# Patient Record
Sex: FEMALE | Race: WHITE | Employment: OTHER | URBAN - METROPOLITAN AREA
[De-identification: names, ages, dates, MRNs, and addresses within clinical notes are randomized per-mention and may not be internally consistent; named-entity substitution may affect disease eponyms.]

---

## 2019-06-15 ENCOUNTER — TRANSFERRED RECORDS (OUTPATIENT)
Dept: HEALTH INFORMATION MANAGEMENT | Facility: CLINIC | Age: 39
End: 2019-06-15

## 2019-11-08 ENCOUNTER — TRANSFERRED RECORDS (OUTPATIENT)
Dept: HEALTH INFORMATION MANAGEMENT | Facility: CLINIC | Age: 39
End: 2019-11-08

## 2019-11-13 ENCOUNTER — TRANSFERRED RECORDS (OUTPATIENT)
Dept: HEALTH INFORMATION MANAGEMENT | Facility: CLINIC | Age: 39
End: 2019-11-13

## 2019-11-14 ENCOUNTER — TRANSFERRED RECORDS (OUTPATIENT)
Dept: HEALTH INFORMATION MANAGEMENT | Facility: CLINIC | Age: 39
End: 2019-11-14

## 2019-11-15 ENCOUNTER — TRANSFERRED RECORDS (OUTPATIENT)
Dept: HEALTH INFORMATION MANAGEMENT | Facility: CLINIC | Age: 39
End: 2019-11-15

## 2019-11-16 ENCOUNTER — TRANSFERRED RECORDS (OUTPATIENT)
Dept: HEALTH INFORMATION MANAGEMENT | Facility: CLINIC | Age: 39
End: 2019-11-16

## 2019-11-19 ENCOUNTER — TRANSFERRED RECORDS (OUTPATIENT)
Dept: HEALTH INFORMATION MANAGEMENT | Facility: CLINIC | Age: 39
End: 2019-11-19

## 2019-11-20 ENCOUNTER — TRANSFERRED RECORDS (OUTPATIENT)
Dept: HEALTH INFORMATION MANAGEMENT | Facility: CLINIC | Age: 39
End: 2019-11-20

## 2019-11-21 NOTE — TELEPHONE ENCOUNTER
MEDICAL RECORDS REQUEST   [facility]  Urology Clinic  [address] [place] [address]  PHONE: [phone]  Fax: [phone]        FUTURE VISIT INFORMATION                                                   RUSH Cesar: 1980 scheduled for future visit at [facility]    APPOINTMENT INFORMATION:    Date: 19 2PM    Provider:  Kerwin Salazar MD    Reason for Visit/Diagnosis: Leakage from urinary catheter     REFERRAL INFORMATION:    Referring provider:  Eduarda Bernstein     Specialty: MD     Referring providers clinic:  [facility]    Clinic contact number: [phone]      RECORDS REQUESTED FOR VISIT                                                     NOTES  STATUS/DETAILS   OFFICE NOTE from referring provider  RECEIVED    OFFICE NOTE from other specialist  RECEIVED   DISCHARGE SUMMARY from hospital  NA   DISCHARGE REPORT from the ER  NA   OPERATIVE REPORT  NA   MEDICATION LIST  RECEIVED     PRE-VISIT CHECKLIST      Record collection complete YES- [facility]  Images in  PACS   CT AND Pelvis 3/4/19   CT urogram 19    If no, please explain: Fax to [facility] to obtain recs -     Appointment appropriately scheduled           (right time/right provider) Yes   MyChart activation If no, please explain: In process   Questionnaire complete If no, please explain: In process      Action    Action Taken 2ND REQUEST SENT FOR IMG ([facility]) CDK 2019       Completed by: Jessica Gill

## 2019-11-27 ENCOUNTER — TRANSFERRED RECORDS (OUTPATIENT)
Dept: HEALTH INFORMATION MANAGEMENT | Facility: CLINIC | Age: 39
End: 2019-11-27

## 2019-12-02 ENCOUNTER — TRANSFERRED RECORDS (OUTPATIENT)
Dept: HEALTH INFORMATION MANAGEMENT | Facility: CLINIC | Age: 39
End: 2019-12-02

## 2019-12-10 ENCOUNTER — PRE VISIT (OUTPATIENT)
Dept: UROLOGY | Facility: CLINIC | Age: 39
End: 2019-12-10

## 2019-12-10 DIAGNOSIS — N31.9 NEUROGENIC BLADDER: Primary | ICD-10-CM

## 2019-12-10 NOTE — TELEPHONE ENCOUNTER
Reason for visit: Discuss options for neurogenic bladder and incontinence     Relevant information: pt has a urethral catheter, urethra is eroding per chart note, history of spinal cord injury    Records/imaging/labs/orders: labs available, orders for renal US placed    Pt called: message sent to scheduling team to set up renal US    At Rooming: regular

## 2019-12-16 ENCOUNTER — ANCILLARY PROCEDURE (OUTPATIENT)
Dept: ULTRASOUND IMAGING | Facility: CLINIC | Age: 39
End: 2019-12-16
Attending: UROLOGY
Payer: MEDICARE

## 2019-12-16 ENCOUNTER — OFFICE VISIT (OUTPATIENT)
Dept: UROLOGY | Facility: CLINIC | Age: 39
End: 2019-12-16
Payer: COMMERCIAL

## 2019-12-16 ENCOUNTER — PRE VISIT (OUTPATIENT)
Dept: UROLOGY | Facility: CLINIC | Age: 39
End: 2019-12-16

## 2019-12-16 DIAGNOSIS — N31.9 NEUROGENIC BLADDER: Primary | ICD-10-CM

## 2019-12-16 DIAGNOSIS — N31.9 NEUROGENIC BLADDER: ICD-10-CM

## 2019-12-16 PROBLEM — I26.99 PULMONARY EMBOLISM (H): Status: ACTIVE | Noted: 2018-02-01

## 2019-12-16 PROBLEM — A49.02 MRSA (METHICILLIN RESISTANT STAPHYLOCOCCUS AUREUS): Status: ACTIVE | Noted: 2018-09-10

## 2019-12-16 RX ORDER — INSULIN GLARGINE 100 [IU]/ML
25 INJECTION, SOLUTION SUBCUTANEOUS
COMMUNITY
Start: 2019-07-30

## 2019-12-16 RX ORDER — ARIPIPRAZOLE 2 MG/1
2 TABLET ORAL
COMMUNITY
Start: 2019-07-30

## 2019-12-16 RX ORDER — NICOTINE POLACRILEX 4 MG
15-30 LOZENGE BUCCAL
COMMUNITY

## 2019-12-16 RX ORDER — VANCOMYCIN 1.75 GRAM/500 ML IN 0.9 % SODIUM CHLORIDE INTRAVENOUS
COMMUNITY
Start: 2019-07-30

## 2019-12-16 RX ORDER — FERROUS SULFATE 325(65) MG
325 TABLET ORAL
COMMUNITY
Start: 2019-07-30

## 2019-12-16 RX ORDER — METRONIDAZOLE 500 MG/1
500 TABLET ORAL
COMMUNITY

## 2019-12-16 RX ORDER — DIPHENHYDRAMINE HCL 25 MG
25 TABLET ORAL
COMMUNITY
Start: 2019-07-30

## 2019-12-16 RX ORDER — AMPICILLIN TRIHYDRATE 250 MG
2 CAPSULE ORAL
COMMUNITY

## 2019-12-16 RX ORDER — ASCORBIC ACID 500 MG
500 TABLET ORAL
COMMUNITY

## 2019-12-16 RX ORDER — ONDANSETRON 4 MG/1
4 TABLET, ORALLY DISINTEGRATING ORAL
COMMUNITY
Start: 2019-07-30

## 2019-12-16 RX ORDER — LIRAGLUTIDE 6 MG/ML
1.8 INJECTION SUBCUTANEOUS
COMMUNITY

## 2019-12-16 RX ORDER — LOPERAMIDE HYDROCHLORIDE 2 MG/1
2 TABLET ORAL
COMMUNITY

## 2019-12-16 RX ORDER — MONTELUKAST SODIUM 4 MG/1
TABLET, CHEWABLE ORAL
Refills: 0 | COMMUNITY
Start: 2019-03-27

## 2019-12-16 ASSESSMENT — PAIN SCALES - GENERAL: PAINLEVEL: NO PAIN (0)
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
## 2019-12-16 NOTE — PATIENT INSTRUCTIONS
Follow up with Dr. Salazar in one year with renal US.        It was a pleasure meeting with you today.  Thank you for allowing me and my team the privilege of caring for you today.  YOU are the reason we are here, and I truly hope we provided you with the excellent service you deserve.  Please let us know if there is anything else we can do for you so that we can be sure you are leaving completely satisfied with your care experience.

## 2019-12-16 NOTE — PROGRESS NOTES
[facility]  MEDICAL EQUIPMENT & SUPPLIES    UROLOGICAL ORDER FORM      Patient Information:    Customer's Name: Regis Merritt  YOB: 1980  Address: [address] [place] [address]  [address]  [facility] [address]  Phone #: [phone]  Diagnosis: Neurogenic Bladder (N31.9)    Product Needed:    Irrigation Syringes  Instruction: Use daily to irrigate bangura catheter  QTY: 4 per month  Length of need: 99      ORDERING Physician/PA/NP        Dr. Kerwni Salazar  DATE: 12/16/19    [facility]  [address] [place] [address] [id number]  [place], [place], [address]      FAX to [facility]  [address] [facility] [address]  [place] [address]  Phone: [phone]  Fax: [phone]

## 2019-12-16 NOTE — NURSING NOTE
Chief Complaint   Patient presents with     Consult     discuss SPT, urethra is eroding       There were no vitals taken for this visit. There is no height or weight on file to calculate BMI.    Patient Active Problem List   Diagnosis     Neurogenic bladder     Candidiasis of vulva and vagina     Hyperlipidemia     Impaired physical mobility     Morbid obesity (H)     MRSA (methicillin resistant Staphylococcus aureus)     Neurogenic bowel     Paraplegia following spinal cord injury (H)     Paronychia of great toe of left foot     Pulmonary embolism (H)     Right ischial pressure sore     Sleep apnea     T4 spinal cord injury (H)     Uncontrolled type 2 diabetes mellitus with complication, without long-term current use of insulin (H)       Allergies   Allergen Reactions     Chlorhexidine Rash     Patient develops red, itchy rash following use of chlorhexidine gluconate preoperative prep     Fd&C Blue #2 Al Wu-Paroxetine Nausea and Vomiting     Other reaction(s): Hallucinations     Adhesive Tape      Paper tape-blisters, medipore tape works best     Alc-Fluoxetine Nausea and Vomiting     Other reaction(s): Hallucinations     Dust Mite Extract      Positive allergy test     Lactose Diarrhea     Metronidazole Nausea     Other reaction(s): Dizziness     No Clinical Screening - See Comments      Flowers (orchids/mums) cause difficulty breathing due to fragrance and Pollen (weeds) pos allergy test  Steri-strips cause blisters     Paxil [Paroxetine]      Prozac [Fluoxetine]      Food Rash     Corn      Gluten Meal Rash     Nickel Itching and Rash     And stainless steel       Current Outpatient Medications   Medication Sig Dispense Refill     Acetaminophen (TYLENOL PO) Take 325 mg by mouth       albuterol (PROAIR HFA, PROVENTIL HFA, VENTOLIN HFA) 108 (90 BASE) MCG/ACT inhaler Inhale 2 puffs into the lungs every 6 hours       ASPIRIN PO Take 81 mg by mouth       bisacodyl (DULCOLAX) 10 MG suppository Place 10 mg rectally  "daily as needed for constipation       Citalopram Hydrobromide (CELEXA PO) Take 10 mg by mouth       DIPHENHYDRAMINE HCL PO Take 25 mg by mouth       divalproex (DEPAKOTE ER) 500 MG 24 hr tablet Take 500 mg by mouth daily       DOCUSATE SODIUM PO Take 100 mg by mouth       IBUPROFEN PO Take 600 mg by mouth       LISINOPRIL PO Take 2.5 mg by mouth       lithium 300 MG tablet Take 600 mg by mouth 3 times daily       menthol-zinc oxide (CALMOSEPTINE) 0.44-20.625 % OINT Apply topically 2 times daily as needed for skin protection       MetFORMIN HCl (GLUCOPHAGE PO) Take 1,000 mg by mouth       methenamine hippurate (HIPREX) 1 G TABS Take 1 g by mouth 2 times daily       miconazole (MICATIN; MICRO GUARD) 2 % powder Apply topically as needed for itching or other       montelukast (SINGULAIR) 10 MG tablet Take 10 mg by mouth At Bedtime       Nitrofurantoin Monohyd Macro (MACROBID PO) Take 100 mg by mouth       nystatin (MYCOSTATIN) cream Apply topically 2 times daily       polyethylene glycol (MIRALAX/GLYCOLAX) packet Take 1 packet by mouth daily       pseudoePHEDrine (SUDAFED) 30 MG/5ML liquid Take 30 mg by mouth 4 times daily as needed for congestion       saccharomyces boulardii (FLORASTOR) 250 MG capsule Take 250 mg by mouth 2 times daily       SIMVASTATIN PO Take 20 mg by mouth         Social History     Tobacco Use     Smoking status: Never Smoker     Smokeless tobacco: Never Used   Substance Use Topics     Alcohol use: No     Drug use: No     Patient does not know what medications she takes and did not bring a medication list with her. Patient stated,\" I didn't know I needed to bring a list with me. I don't know what medications I take, it is a laundry list.\"    Tangela Romero LPN  12/16/2019  2:01 PM  "
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
## 2022-10-04 PROBLEM — E11.8 TYPE 2 DIABETES MELLITUS WITH UNSPECIFIED COMPLICATIONS (H): Status: ACTIVE | Noted: 2018-07-21
